# Patient Record
Sex: FEMALE | Race: WHITE | Employment: FULL TIME | URBAN - METROPOLITAN AREA
[De-identification: names, ages, dates, MRNs, and addresses within clinical notes are randomized per-mention and may not be internally consistent; named-entity substitution may affect disease eponyms.]

---

## 2021-03-03 ENCOUNTER — THERAPY VISIT (OUTPATIENT)
Dept: PHYSICAL THERAPY | Facility: CLINIC | Age: 31
End: 2021-03-03
Payer: COMMERCIAL

## 2021-03-03 DIAGNOSIS — R39.15 URGENCY OF URINATION: ICD-10-CM

## 2021-03-03 DIAGNOSIS — M99.05 SOMATIC DYSFUNCTION OF PELVIS REGION: ICD-10-CM

## 2021-03-03 PROCEDURE — 97535 SELF CARE MNGMENT TRAINING: CPT | Mod: GP | Performed by: PHYSICAL THERAPIST

## 2021-03-03 PROCEDURE — 97161 PT EVAL LOW COMPLEX 20 MIN: CPT | Mod: GP | Performed by: PHYSICAL THERAPIST

## 2021-03-03 PROCEDURE — 97112 NEUROMUSCULAR REEDUCATION: CPT | Mod: GP | Performed by: PHYSICAL THERAPIST

## 2021-03-03 NOTE — PROGRESS NOTES
[facility] Initial Evaluation  Subjective:  The history is provided by the patient. No  was used.   Therapist Generated HPI Evaluation  Problem details: Patient reports she has had chronic UTI's and now having pelvic pain. Works as a . Works for a printing company.    Had 2 cystoscopies.  Did some urethral dilation and this helped a little.  Biggest trigger is intercourse.  Most of last year was on anti-biotics because of UTI's. Has had pain with intercourse.  Did not used to be painful.  Can delay urination for only 20 min.  Up 3-4 times at night.  Having symptoms of a bladder infection now but not having an infection now.  Feels aching in the lower abdominal region but does not have incontinence. Used to strain and push to get urine out. Does not have constipation. Has a mini tramp at home and some yoga and walking for exercise.  Drinking enough water and not caffeine.  Only using liners and pads now for her period.    .         Type of problem:  Pelvic dysfunction.    This is a recurrent condition.  Condition occurred with:  Insidious onset.  Where condition occurred: at home.  Patient reports pain:  Vaginal (lower abdominal).  Pain is described as aching and burning     Since onset symptoms are unchanged.  Symptoms are exacerbated by intercourse  Relieved by: avoiding intercourse.      Restrictions due to condition include:  Working in normal job without restrictions.  Barriers include:  None as reported by patient.                        Objective:  System                                 Pelvic Dysfunction Evaluation:    Bladder/Pelvic Problems:    Storage Problem:  Urgency and frequency  Emptying Problem:  Dyspareunia  Dyspareunia:  Grade 1    Diagnostic Tests:    Pelvic Exam:  Reports MD said retroverted uterus    Post Void Residual:  Reports normal                  Flexibility:  normal      Abdominal Wall:  normal        Pelvic Clock Exam:             Perineal Body:  +  SI Provocation:  NA        Reflex Testing:  normal    External Assessment:  normal              Internal Assessment:  Internal assessment pelvic: enlarged distal urethra but appears to move normally.  Sensory Exam:  Normal  Contraction/Grade:  Fair squeeze, definite lift (3)          SEMG Biofeedback:    Equipment:  [employer] with computer            Sustained contraction:  Some slowness to relax after contraction    Position:  SupineAdditional History:        Caffeine Consumption:  None                     General     ROS    Assessment/Plan:    Patient is a 30 year old female with pelvic complaints.    Patient has the following significant findings with corresponding treatment plan.                Diagnosis 1:  Pelvic dysfunction  Pain -  manual therapy, self management, education and home program  Decreased ROM/flexibility - manual therapy, therapeutic exercise and home program  Impaired muscle performance - biofeedback, neuro re-education and home program  Decreased function - therapeutic activities and home program    Therapy Evaluation Codes:   1) History comprised of:   Personal factors that impact the plan of care:      Time since onset of symptoms.    Comorbidity factors that impact the plan of care are:      None.     Medications impacting care: anti-biotics.  2) Examination of Body Systems comprised of:   Body structures and functions that impact the plan of care:      Pelvis.   Activity limitations that impact the plan of care are:      [place] and Urgency.  3) Clinical presentation characteristics are:   Stable/Uncomplicated.  4) Decision-Making    Low complexity using standardized patient assessment instrument and/or measureable assessment of functional outcome.  Cumulative Therapy Evaluation is: Low complexity.    Previous and current functional limitations:  (See Goal Flow Sheet for this information)    Short term and Long term goals: (See Goal Flow Sheet for this  information)     Communication ability:  Patient appears to be able to clearly communicate and understand verbal and written communication and follow directions correctly.  Treatment Explanation - The following has been discussed with the patient:   RX ordered/plan of care  Anticipated outcomes  Possible risks and side effects  This patient would benefit from PT intervention to resume normal activities.   Rehab potential is excellent.    Frequency:  2 X a month, once daily  Duration:  for 2 months  Discharge Plan:  Achieve all LTG.  Independent in home treatment program.  Reach maximal therapeutic benefit.    Please refer to the daily flowsheet for treatment today, total treatment time and time spent performing 1:1 timed codes.

## 2021-03-05 NOTE — PROGRESS NOTES
[facility] Initial Evaluation  Subjective:    Patient Health History           General health as reported by patient is good.  Pertinent medical history includes: none.   Red flags:  None as reported by patient.  Medical allergies: none.   Surgeries include:  Other. Other surgery history details: cystocopy procedue x 2 [id number]?, tonsils/removed as a child.    Current medications:  None.    Current occupation .   Primary job tasks include:  Computer work and prolonged sitting.                                    Objective:  System    Physical Exam    General     ROS    Assessment/Plan:

## 2021-04-07 PROBLEM — M99.05 SOMATIC DYSFUNCTION OF PELVIS REGION: Status: RESOLVED | Noted: 2021-03-03 | Resolved: 2021-04-07
